# Patient Record
(demographics unavailable — no encounter records)

---

## 2019-08-12 NOTE — EMERGENCY DEPARTMENT RECORD
History of Present Illness





- General


Chief Complaint: Shortness of breath


Stated Complaint: BRONCHITIS WORSE


Time Seen by Provider: 19 12:03


Source: Patient


Mode of Arrival: Ambulatory


Limitations: No limitations





- History of Present Illness


Initial Comments: 


65 yo female presents with a cough that has been ongoing for about three weeks. 

The cough has gradually increased.  She was seen over the weekend at the [facility].  She was given breathing treatments, cough medication, steroids and

antibiotics.  She is not improved.  She now has some green sputum.  No fever.  

No blood in the sputum.  She is a non smoker.  No pulmonary disease history.  





MD Complaint: Cough


Onset/Timin


-: Week(s)


Severity: Moderate


Consistency: Constant


Improves With: Nothing


Worsens With: Coughing


Known History Of: Other


Associated Symptoms: Cough, Sputum production





- Related Data


                                Home Medications











 Medication  Instructions  Recorded  Confirmed  Last Taken


 


Albuterol Sulfate [Proair Hfa] 1 - 2 puff IH .EVERY 4-6 HOURS PRN 19 Unknown


 


Benzonatate 200 mg PO TID 19 Unknown


 


Clobetasol Propionate [Temovate] 30 gm TP ASDIR 19 Unknown


 


Ipratropium Bromide [Atrovent Hfa] 2 spray IH TID 19 Unknown


 


Lisinopril/Hydrochlorothiazide 2 tab PO DAILY 19 Unknown





[Lisinopril-Hctz 20-12.5 mg Tab]    


 


Prednisone [Prednisone 10Mg] 10 mg PO ASDIR 19 Unknown











                                    Allergies











Allergy/AdvReac Type Severity Reaction Status Date / Time


 


amoxicillin Allergy  RASH Verified 19 12:01


 


metronidazole Allergy  NAUSEA AND Verified 19 12:01





   VOMITING  














Travel Screening





- Travel/Exposure Within Last 30 Days


Have you traveled within the last 30 days?: No





- Travel/Exposure Within Last Year


Have you traveled outside the [place] in the last year?: No





- Additonal Travel Details


Have you been exposed to anyone with a communicable illness?: No





- Travel Symptoms


Symptom Screening: None





Review of Systems


Constitutional: Denies: Chills, Fever, Malaise, Weakness


Eyes: Denies: Eye discharge


ENT: Denies: Congestion, Throat pain


Respiratory: Reports: Cough, Dyspnea, Wheezes.  Denies: Hemoptysis, Stridor


Cardiovascular: Denies: Chest pain, Palpitations, Syncope


Endocrine: Denies: Fatigue, Polydipsia, Polyuria


Gastrointestinal: Denies: Abdominal pain, Diarrhea, Nausea, Vomiting


Genitourinary: Denies: Dysuria, Urgency


Musculoskeletal: Denies: Arthralgia, Back pain, Myalgia, Neck pain


Neurological: Denies: Headache, Numbness, Weakness


Psychiatric: Denies: Anxiety


Hematological/Lymphatic: Denies: Easy bleeding, Easy bruising





Past Medical History





- SOCIAL HISTORY


Smoking Status: Never smoker


Alcohol Use: Rare


Drug Use: None





- RESPIRATORY


Hx Respiratory Disorders: No





- CARDIOVASCULAR


Hx Cardio Disorders: Yes


Hx Hypertension: Yes


Hx Irregular Heartbeat: Yes





- NEURO


Hx Neuro Disorders: No





- GI


Hx GI Disorders: No





- 


Hx Genitourinary Disorders: No





- ENDOCRINE


Hx Endocrine Disorders: No





- MUSCULOSKELETAL


Hx Musculoskeletal Disorders: No





- PSYCH


Hx Psych Problems: No





- HEMATOLOGY/ONCOLOGY


Hx Hematology/Oncology Disorders: Yes


Hx Cancer: Yes (uterine)


Hx Chemotherapy: Yes


Hx Radiation Therapy: Yes





Family Medical History


Any Significant Family History?: No





Physical Exam





- General


General Appearance: Alert, Oriented x3, Cooperative, No acute distress


Limitations: No limitations





- Head


Head exam: Atraumatic, Normal inspection





- Eye


Eye exam: Normal appearance, PERRL.  negative: Conjunctival injection, Scleral 

icterus





- ENT


ENT exam: Normal exam, Mucous membranes moist


Ear exam: Normal external inspection


Nasal Exam: Normal inspection


Mouth exam: Normal external inspection





- Neck


Neck exam: Normal inspection





- Respiratory


Respiratory exam: negative: Accessory muscle use, Prolonged expiratory, Rhonchi,

Stridor, Wheezes





- Cardiovascular


Cardiovascular Exam: Regular rate, Normal rhythm, Normal heart sounds





- GI/Abdominal


GI/Abdominal exam: Soft.  negative: Tenderness





- Rectal


Rectal exam: Deferred





- 


 exam: Deferred





- Extremities


Extremities exam: Normal inspection, Full ROM.  negative: Calf tenderness, Pedal

edema, Tenderness





- Back


Back exam: Denies: CVA tenderness (R), CVA tenderness (L)





- Neurological


Neurological exam: Alert, Oriented X3





- Psychiatric


Psychiatric exam: Normal affect, Normal mood





- Skin


Skin exam: Dry, Intact, Normal color, Warm





Course





                                   Vital Signs











  19





  11:51


 


Temperature 97.8 F


 


Pulse Rate 71


 


Respiratory 20





Rate 


 


Blood Pressure 199/102


 


Pulse Ox 97














- Reevaluation(s)


Reevaluation #1: 





19 13:35


The CXR was reviewed


No acute process


The CBC was negative for acute process


The HCO3 was 18 and AG 18


K is 3.3


19 13:54


The patient continues to have some wheezing and coughing 


I recommend OBV for breathing treatments and initiation of antibiotic with the 

new green sputum





Medical Decision Making





- Lab Data


Result diagrams: 


                                 19 12:55





                                 19 12:55





Disposition


Disposition: Admit


Clinical Impression: 


 Bronchitis, Reactive airway disease that is not asthma





Dyspnea


Qualifiers:


 Dyspnea type: unspecified Qualified Code(s): R06.00 - Dyspnea, unspecified





Disposition: Still a Patient at [facility]


Decision to Admit: Admit from ER


Decision to Admit Date: 19


Decision to Admit Time: 13:56


Condition: (2) Stable


Forms:  Patient Portal Access


Time of Disposition: 13:56





Quality





- Quality Measures


Quality Measures: N/A





- Blood Pressure Screening


Does Patient Have Any of the Following: Active Dx of HTN


Blood Pressure Classification: Hypertensive Reading


Systolic Measurement: 199


Diastolic Measurement: 102


Screening for High Blood Pressure: Patient Exclusion, Hx of HTN []

## 2019-08-12 NOTE — RADIOLOGY REPORT
EXAM:  CHEST 2 VIEWS



HISTORY:  NONPRODUCTIVE COUGH FOR TWO WEEKS. 



TECHNIQUE:  Two views of the chest. 



COMPARISON: None. 



FINDINGS:  The heart and mediastinum appear normal. The lungs are clear. 
Skeletal structures show no acute or suspicious finding. 



IMPRESSION:  NO SIGN OF ACUTE CHEST PATHOLOGY.



JOB NUMBER: [id number]

MTDD

## 2019-08-13 NOTE — DISCHARGE SUMMARY
Providers


Discharge Summary Date: 08/13/19


Date of admission: 


08/12/19 14:18





Attending physician: 


RICK DENT





Primary care physician: 


VIANEY TAYLOR D.O.








Physical Exam





- Vital Signs


Vital Signs: 


                            Vital Signs - Last 24 Hrs











  Temp Pulse Pulse Resp BP Pulse Ox


 


 08/13/19 11:12  97.7 F   87  18  141/76  98


 


 08/13/19 10:13   69   18   97


 


 08/13/19 07:38   78   20   99


 


 08/13/19 06:09   65   16   96


 


 08/13/19 04:50  97.7 F   56 L  18  170/62  98


 


 08/12/19 22:09   68   16   95


 


 08/12/19 20:35     20  


 


 08/12/19 19:51  98.2 F   73  20  170/71  96


 


 08/12/19 17:59   75   18   98


 


 08/12/19 15:26    94 H  24  


 


 08/12/19 14:59   81   20   99


 


 08/12/19 14:24  98.1 F   72  19  195/89  100


 


 08/12/19 14:09    77  16  197/90  98


 


 08/12/19 13:34    80  20  199/92  97


 


 08/12/19 12:08   75   22   98














- General


General Appearance: Alert, Oriented x3, Cooperative, No acute distress


Limitations: No limitations





- Head


Head exam: Atraumatic, Normal inspection





- Eye


Eye exam: Normal appearance, PERRL.  negative: Conjunctival injection, Scleral 

icterus





- ENT


ENT exam: Normal exam, Mucous membranes moist


Ear exam: Normal external inspection


Nasal Exam: Normal inspection


Mouth exam: Normal external inspection





- Neck


Neck exam: Normal inspection





- Respiratory


Respiratory exam: Wheezes.  negative: Accessory muscle use, Prolonged 

expiratory, Rhonchi, Stridor





- Cardiovascular


Cardiovascular Exam: Regular rate, Normal rhythm, Normal heart sounds





- GI/Abdominal


GI/Abdominal exam: Soft.  negative: Tenderness





- Rectal


Rectal exam: Deferred





- 


 exam: Deferred





- Extremities


Extremities exam: Normal inspection, Full ROM.  negative: Calf tenderness, Pedal

edema, Tenderness





- Back


Back exam: Denies: CVA tenderness (R), CVA tenderness (L)





- Neurological


Neurological exam: Alert, Oriented X3





- Psychiatric


Psychiatric exam: Normal affect, Normal mood





- Skin


Skin exam: Dry, Intact, Normal color, Warm





Hospitalization





- Hospitalization


Admission Diagnosis: Bronchitis, wheezying, dyspnea





- Problem List/Discharge Diagnosis


(1) Dyspnea


Status: Acute   


Discharge Diagnosis: 


   Dyspnea type: unspecified   Qualified Code(s): R06.00 - Dyspnea, unspecified 

 


Base Code: R06.00 - DYSPNEA, UNSPECIFIED   Comment: 8/13/19:


-Cough x 3 weeks, increasing dyspnea, green sputum production


-CXR normal, no pulmonary hx


-Nebulizer machine ordered and given


-Albuterol nebs TID PRN ordered for home use


-Continue Tessalon Perles q. 8 hours PRN (pt already has supply at home)


-Trial Mucinex 600mg PO BID until increased secretions improve   





(2) Bronchitis


Status: Acute   Base Code: J40 - BRONCHITIS, NOT SPECIFIED AS ACUTE OR CHRONIC  

Comment: 8/13/19:


-Increased dyspnea despite intervention by [facility] on 8/10/19


-Zithromycin 500mg PO x 1 in am on 8/14/19 to total three 500mg doses


-Prednisone 50mg daily x 5, start on 8/14/19


-Albuterol Nebs TID PRN


-Nebulizer machine ordered and given to pt


-Continue Tessalon perles q. 8 hours PRN


-Mucinex 600mg PO BID PRN


   





(3) Essential hypertension with goal blood pressure less than 140/90


Status: Acute   Base Code: I10 - ESSENTIAL (PRIMARY) HYPERTENSION   Comment: 

8/13/19:


-Had not been taking BP meds over the past few days


-/76 this a.m. after receiving Norvasc 2.5mg, Lisinopril 40mg and 25mg 

Hctz yesterday


-Home meds:  Lisinopril 20/12.5mg, Lisinopril 20mg and Hctz 25mg daily


-Stop Lisinopril and combination Lisinopril/Hctz d/t constant tickle in throat


-Start Losartan 50mg daily and Hctz 25mg daily (kidney function normal)


-Pt to f/u in 2 weeks with [facility] to establish care.    





(4) DVT prophylaxis


Status: Acute   Base Code: Z29.9 - ENCOUNTER FOR PROPHYLACTIC MEASURES, 

UNSPECIFIED   Comment: 8/13/19


-Moderate risk


-Nursing to encourage ambulation    





(5) Full code status


Status: Acute   Base Code: Z78.9 - OTHER SPECIFIED HEALTH STATUS   Comment: 

8/13/19:


-Full code this admission   





- Hospitalization Course


Disposition: Home, Self-Care


Hospital Course: 





Kiara Weinberg is a 64 y.o.  F who presented to [facility] ED with c/o cough x

3 weeks and increasing SOB. Was seen on 8/10/19 at [facility] and was 

given breathing tx, tessalon perles, a prednisone taper and antibiotics. She 

presented to ED d/t feeling as thought she couldn't catch her breath. She denied

fever, blood in sputum or chills. Did report green sputum. No hx of smoking, 

COPD or asthma. 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

house cleaning as an occupation. Previous PCP: Dr. Taylor  who has retired. 





ED course





-CXR: no acute process


-CBC WNL


-HCO3: 18, AG 18, K+ 3.3


-Was started on Azithromycin, breathing tx and IV Salumedrol in the ED





8/13/19 1130


VS: T 97.7, HR 87, /76, RR 18, SPO2 98% on RA





Pt sitting up in bedside chair with spouse at bedside. A&Ox3. Reports feeling 

better than yesterday and as though she can breathe today. Reports cough is 

still present. Reports that she does have a constant "tickle" in her throat x 

many years in addition to acute cough x 3 weeks.  States that the tessalon 

perles have not been effective in helping relief the cough. Denies having any 

pain or SOB. Feels ready to discharge. 





BP elevated last evening. Was started on Norvasc 2.5mg daily. BP improved today.

Pt reports that she hasn't taken her BP meds for a few days d/t feeling 

nauseated and not well from the bronchitis. Has been on Lisinopril 40mg daily x 

20 years which she relates to the constant "tickle". Appears as though pt is 

also taking 62.5mg of HCTZ at home through combo meds. Discussed changing BP 

regimen with pt and pt agreeable as pt will be following up as a new patient 

with [facility] in approximately 2 weeks. 








Procedures: 


Imaging and X-Rays





08/12/19 12:07


CHEST 2 VIEWS [RAD] Stat 








Cardiology Procedures





08/12/19 14:31


Cardiac Monitor .Continuous 











Abnormal Labs: 


                              Abnormal Lab Results











  08/12/19 08/12/19 08/13/19 Range/Units





  12:55 12:55 07:08 


 


MPV  11.2 H   11.0 H  (7.4-10.4)  fl


 


Lymphocytes    15.0 L  (16-45)  %


 


Potassium   3.3 L   (3.4-4.5)  mmol/L


 


Carbon Dioxide   18.0 L   (22-29)  mmol/L


 


Anion Gap   18.0 H   (7-16)  


 


Random Glucose   154 H   ()  mg/dL














  08/13/19 Range/Units





  07:08 


 


MPV   (7.4-10.4)  fl


 


Lymphocytes   (16-45)  %


 


Potassium   (3.4-4.5)  mmol/L


 


Carbon Dioxide   (22-29)  mmol/L


 


Anion Gap   (7-16)  


 


Random Glucose  176 H  ()  mg/dL











Condition at Discharge: (1) Good





Discharge Medications





- Discharge Medications


Prescriptions: 


Hydrochlorothiazide [Hctz] 25 mg PO DAILY 30 Days #30 tablet


Losartan Potassium 50 mg PO DAILY 30 Days #30 tablet


Prednisone 50 mg PO DAILY 5 Days #5 tablet


Azithromycin [Zithromax] 500 mg PO DAILY 1 Days #1 tab


Home Medications: 


                                Ambulatory Orders





Albuterol Sulfate [Proair Hfa] 1 - 2 puff IH .EVERY 4-6 HOURS PRN 08/12/19 [Last

 Taken Unknown]


Benzonatate 200 mg PO TID 08/12/19 [Last Taken Unknown]


Clobetasol Propionate [Temovate] 30 gm TP ASDIR 08/12/19 [Last Taken Unknown]


Ipratropium Bromide [Atrovent Hfa] 2 spray IH TID 08/12/19 [Last Taken Unknown]


Acetaminophen [Tylenol 325Mg] 650 mg PO Q6H PRN  tablet 08/13/19 [Last Taken 

Unknown]


Azithromycin [Zithromax] 500 mg PO DAILY 1 Days #1 tab 08/13/19 [Last Taken 

Unknown]


Hydrochlorothiazide [Hctz] 25 mg PO DAILY 30 Days #30 tablet 08/13/19 [Last 

Taken Unknown]


Losartan Potassium 50 mg PO DAILY 30 Days #30 tablet 08/13/19 [Last Taken 

Unknown]


Prednisone 50 mg PO DAILY 5 Days #5 tablet 08/13/19 [Last Taken Unknown]











Discharge Plan





- Discharge Instructions


Activity at Discharge: Resume Usual Activities As Tolerated


Diet at Discharge: Regular Diet


Instructions:  Acute Bronchitis (GEN), How Your Lungs Work (DC)


Additional Instructions: 











Activity: 


 


 Resume Usual Activities As Tolerated














Diet: Regular Diet














 














Follow Up: with new Primary Care Physician














  





  














Additional: 





Take Mucinex 600mg every 12 hours until sputum production decreases





Can take Tessalon Perles as needed





Drink 8-10 cups of water per day





Use Albuterol nebulizers up to 3 times per day as needed





Carry Albuterol rescue inhaler when away from home





Stop taking the blood pressure medications that you have at home





Start Hydrochlorothiazide 25mg every morning (this is a water pill)





Start Losartan 50mg every morning








Quality Measures





- Quality Measures


Quality Measures: Documentation of Current Medications in Medical Record, 

Screening for High Blood Pressure and F/U Documented





- Current Medications


Quality Measure: Measure #130: Documentation of Current Medications


Documentation of Current Medications: <Current Medications Documented/Reviewed> 

[]





- Blood Pressure Screening


Quality Measure: Screening for High Blood Pressure and Follow-Up Documented


Does Patient Have Any of the Following: Active Dx of HTN


Blood Pressure Classification: Hypertensive Reading


Systolic Measurement: 141


Diastolic Measurement: 76


Screening for High Blood Pressure: Patient Exclusion, Hx of HTN []





- Elder Abuse Suspicion Index


EASI Reference Information: Tacho WELCH, Josafat C, Antonia D, Vito FRANCO.Development

 and validation of a tool to assist physicians identification of elder abuse: 

The Elder Abuse Suspicion  Index (EASI ).  Journal of Elder Abuse and Neglect, 

2008; 20 (3): 276-300.

## 2019-08-13 NOTE — HISTORY & PHYSICAL
History of Present Illness





- Date of Service


Date of Service for History & Physical: 08/13/19





- History of Present Illness


Admitting Diagnosis: Bronchitis, wheezying, dyspnea


History of Present Illness: 





Kiara Weinberg is a 64 y.o.  F who presented to [facility] ED with c/o cough x

3 weeks and increasing SOB. Was seen on 8/10/19 at [facility] and was 

given breathing tx, tessalon perles, a prednisone taper and antibiotics. She 

presented to ED d/t feeling as thought she couldn't catch her breath. She denied

fever, blood in sputum or chills. Did report green sputum. No hx of smoking, 

COPD or asthma. 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

house cleaning as an occupation. Previous PCP: Dr. Taylor  who has retired. 





ED course





-CXR: no acute process


-CBC WNL


-HCO3: 18, AG 18, K+ 3.3


-Was started on Azithromycin, breathing tx and IV Salumedrol in the ED





8/13/19 1130


VS: T 97.7, HR 87, /76, RR 18, SPO2 98% on RA





Pt sitting up in bedside chair with spouse at bedside. A&Ox3. Reports feeling 

better than yesterday and as though she can breathe today. Reports cough is 

still present. Reports that she does have a constant "tickle" in her throat x 

many years in addition to acute cough x 3 weeks.  States that the tessalon 

perles have not been effective in helping relief the cough. Denies having any 

pain or SOB. Feels ready to discharge. 





BP elevated last evening. Was started on Norvasc 2.5mg daily. BP improved today.

Pt reports that she hasn't taken her BP meds for a few days d/t feeling 

nauseated and not well from the bronchitis. Has been on Lisinopril 40mg daily x 

20 years which she relates to the constant "tickle". Appears as though pt is 

also taking 62.5mg of HCTZ at home through combo meds. Discussed changing BP 

regimen with pt and pt agreeable as pt will be following up as a new patient [facility] in approximately 2 weeks. 





Travel Screening





- Travel/Exposure Within Last 30 Days


Have you traveled within the last 30 days?: No





- Travel/Exposure Within Last Year


Have you traveled outside the [place] in the last year?: No





- Additonal Travel Details


Have you been exposed to anyone with a communicable illness?: No





- Travel Symptoms


Symptom Screening: None





Review of Systems


Reviewed: No additional complaints except as noted below


Constitutional: Denies: Chills, Fever, Malaise, Weakness


Eyes: Denies: Eye discharge


ENT: Denies: Congestion, Throat pain


Respiratory: Reports: Cough, Dyspnea, Wheezes.  Denies: Hemoptysis, Stridor


Cardiovascular: Denies: Chest pain, Dyspnea on exertion, Edema, Palpitations, 

Syncope


Endocrine: Denies: Fatigue


Gastrointestinal: Denies: Abdominal pain, Nausea, Vomiting


Musculoskeletal: Denies: Arthralgia, Back pain, Myalgia, Neck pain


Neurological: Denies: Headache, Numbness, Weakness


Psychiatric: Denies: Anxiety


Hematological/Lymphatic: Denies: Easy bleeding, Easy bruising





Past Medical History





- SOCIAL HISTORY


Smoking Status: Never smoker





- RESPIRATORY


Hx Respiratory Disorders: No





- CARDIOVASCULAR


Hx Cardio Disorders: Yes


Hx Hypertension: Yes


Hx Irregular Heartbeat: Yes





- NEURO


Hx Neuro Disorders: No





- GI


Hx GI Disorders: No





- 


Hx Genitourinary Disorders: No





- ENDOCRINE


Hx Endocrine Disorders: No





- MUSCULOSKELETAL


Hx Musculoskeletal Disorders: No





- PSYCH


Hx Psych Problems: No





- HEMATOLOGY/ONCOLOGY


Hx Hematology/Oncology Disorders: Yes


Hx Cancer: Yes (uterine)


Hx Chemotherapy: Yes


Hx Radiation Therapy: Yes





Family Medical History


Any Significant Family History?: Yes


Hx Cancer: Father, Mother, Grandparents





H&P Meds/Allergies





- Allergies


Allergies: 


                                    Allergies











Allergy/AdvReac Type Severity Reaction Status Date / Time


 


amoxicillin Allergy  RASH Verified 08/12/19 12:01


 


metronidazole Allergy  NAUSEA AND Verified 08/12/19 12:01





   VOMITING  














- Home Medications


                                Home Medications











 Medication  Instructions  Recorded  Confirmed  Last Taken


 


Albuterol Sulfate [Proair Hfa] 1 - 2 puff IH .EVERY 4-6 HOURS PRN 08/12/19 08/12/19 Unknown


 


Benzonatate 200 mg PO TID 08/12/19 08/12/19 Unknown


 


Clobetasol Propionate [Temovate] 30 gm TP ASDIR 08/12/19 08/12/19 Unknown


 


Ipratropium Bromide [Atrovent Hfa] 2 spray IH TID 08/12/19 08/12/19 Unknown








                                  Previous Rx's











 Medication  Instructions  Recorded


 


Acetaminophen [Tylenol 325Mg] 650 mg PO Q6H PRN  tablet 08/13/19


 


Azithromycin [Zithromax] 500 mg PO DAILY 1 Days #1 tab 08/13/19


 


Hydrochlorothiazide [Hctz] 25 mg PO DAILY 30 Days #30 tablet 08/13/19


 


Losartan Potassium 50 mg PO DAILY 30 Days #30 tablet 08/13/19


 


Prednisone 50 mg PO DAILY 5 Days #5 tablet 08/13/19














- Active Medications


Active Medications: 


                               Current Medications





Acetaminophen (Tylenol 325mg)  650 mg PO Q6H PRN


   PRN Reason: PAIN - MILD(1-4)/FEVER


Albuterol Sulfate (Albuterol Sulfate)  2.5 mg INH RESP.Q2H PRN


   PRN Reason: DIFFICULTY IN BREATHING


   Last Admin: 08/13/19 07:38 Dose:  2.5 mg


   Documented by: 


Albuterol/Ipratropium (Duoneb)  3 ml INH RESP.Q4H.WA [facility]


   Last Admin: 08/13/19 10:13 Dose:  3 ml


   Documented by: 


Amlodipine Besylate (Norvasc)  2.5 mg PO DAILY [facility]


   Last Admin: 08/13/19 09:33 Dose:  2.5 mg


   Documented by: 


Azithromycin (Zithromax)  500 mg PO DAILY [facility]


   Last Admin: 08/13/19 09:32 Dose:  500 mg


   Documented by: 


Benzonatate (Tessalon)  200 mg PO Q8HR [facility]


   Last Admin: 08/13/19 05:37 Dose:  200 mg


   Documented by: 


Diphenhydramine HCl (Benadryl Capsule)  50 mg PO QHS PRN


   PRN Reason: INSOMNIA


Guaifenesin (Robitussin Dm)  10 ml PO Q4H PRN


   PRN Reason: COUGH


   Last Admin: 08/13/19 02:30 Dose:  10 ml


   Documented by: 


Hydrochlorothiazide (Hctz 25mg)  25 mg PO DAILY [facility]


   Last Admin: 08/13/19 09:33 Dose:  25 mg


   Documented by: 


CEFTRIAXONE 1GM/50ML BAG (Ceftriaxone 1 Gm-D5w Bag)  1 gm in 50 mls @ 100 mls/hr

 IVPB Q24H [facility]


   Last Infusion: 08/12/19 16:25 Dose:  Infused


   Documented by: 


Potassium Chloride/Sodium Chloride ( Potassium Chl 20meq/)  20 meq in 1,000 mls 

@ 100 mls/hr IV Q10H [facility]


   Last Admin: 08/13/19 02:32 Dose:  100 mls/hr


   Documented by: 


Lisinopril (Zestril)  40 mg PO DAILY [facility]


   Last Admin: 08/13/19 09:32 Dose:  40 mg


   Documented by: 


Methylprednisolone Sodium Succinate (Solu-Medrol)  60 mg IVP Q8HR [facility]


   Last Admin: 08/13/19 05:36 Dose:  60 mg


   Documented by: 











Physical Exam





- Vital Signs


Vital Signs: 


                            Vital Signs - Last 24 Hrs











  Temp Pulse Pulse Resp BP BP Pulse Ox


 


 08/13/19 11:12  97.7 F   87  18   141/76  98


 


 08/13/19 10:13   69   18    97


 


 08/13/19 07:38   78   20    99


 


 08/13/19 06:09   65   16    96


 


 08/13/19 04:50  97.7 F   56 L  18   170/62  98


 


 08/12/19 22:09   68   16    95


 


 08/12/19 20:35     20   


 


 08/12/19 19:51  98.2 F   73  20   170/71  96


 


 08/12/19 17:59   75   18    98


 


 08/12/19 15:26    94 H  24   


 


 08/12/19 14:59   81   20    99


 


 08/12/19 14:24  98.1 F   72  19   195/89  100


 


 08/12/19 14:09    77  16   197/90  98


 


 08/12/19 13:34    80  20   199/92  97


 


 08/12/19 12:08   75   22    98


 


 08/12/19 11:51  97.8 F  71   20  199/102   97














- General


General Appearance: Alert, Oriented x3, Cooperative, No acute distress


Limitations: No limitations





- Head


Head exam: Atraumatic, Normal inspection





- Eye


Eye exam: Normal appearance, PERRL.  negative: Conjunctival injection, Scleral 

icterus





- ENT


ENT exam: Normal exam, Mucous membranes moist


Ear exam: Normal external inspection


Nasal Exam: Normal inspection


Mouth exam: Normal external inspection





- Neck


Neck exam: Normal inspection





- Respiratory


Respiratory exam: Wheezes.  negative: Accessory muscle use, Prolonged 

expiratory, Rhonchi, Stridor





- Cardiovascular


Cardiovascular Exam: Regular rate, Normal rhythm, Normal heart sounds





- GI/Abdominal


GI/Abdominal exam: Soft.  negative: Tenderness





- Rectal


Rectal exam: Deferred





- 


 exam: Deferred





- Extremities


Extremities exam: Normal inspection, Full ROM.  negative: Calf tenderness, Pedal

 edema, Tenderness





- Back


Back exam: Denies: CVA tenderness (R), CVA tenderness (L)





- Neurological


Neurological exam: Alert, Oriented X3





- Psychiatric


Psychiatric exam: Normal affect, Normal mood





- Skin


Skin exam: Dry, Intact, Normal color, Warm





Results





- Labs


Result Diagrams: 


                                 08/13/19 07:08





                                 08/13/19 07:08


Labs Last 24 Hours: 


                         Laboratory Results - last 24 hr











  08/12/19 08/12/19 08/13/19





  12:55 12:55 07:08


 


WBC  10.8   6.7


 


RBC  4.56   4.24


 


Hgb  14.3   13.0


 


Hct  42.1   38.8


 


MCV  92.3   91.5


 


MCH  31.4   30.7


 


MCHC  34.0   33.5


 


RDW  12.4   12.5


 


Plt Count  138   151


 


MPV  11.2 H   11.0 H


 


Gran %  75.4  


 


Neutrophils %    80.0


 


Band Neutrophils %    2.0


 


Lymphocytes %  17.4  


 


Monocytes %  5.9  


 


Eosinophils %  0.1   Not Reportable


 


Basophils %  1.2   Not Reportable


 


Absolute Neutrophils  8.11   5.24


 


Lymphocytes    15.0 L


 


Monocytes    3.0


 


Platelet Estimate    Normal


 


RBC Morphology    Normal


 


Sodium   141 


 


Potassium   3.3 L 


 


Chloride   105 


 


Carbon Dioxide   18.0 L 


 


Anion Gap   18.0 H 


 


BUN   16 


 


Creatinine   0.6 


 


Estimated GFR   > 60 


 


Random Glucose   154 H 


 


Calcium   9.0 














  08/13/19





  07:08


 


WBC 


 


RBC 


 


Hgb 


 


Hct 


 


MCV 


 


MCH 


 


MCHC 


 


RDW 


 


Plt Count 


 


MPV 


 


Gran % 


 


Neutrophils % 


 


Band Neutrophils % 


 


Lymphocytes % 


 


Monocytes % 


 


Eosinophils % 


 


Basophils % 


 


Absolute Neutrophils 


 


Lymphocytes 


 


Monocytes 


 


Platelet Estimate 


 


RBC Morphology 


 


Sodium  145


 


Potassium  3.7


 


Chloride  107


 


Carbon Dioxide  23.0


 


Anion Gap  15.0


 


BUN  8


 


Creatinine  0.5


 


Estimated GFR  > 60


 


Random Glucose  176 H


 


Calcium  9.1














VTE H&P Assessment





- Risk for VTE


Risk for VTE: Yes


Risk Level: Moderate


Risk Assessment Date: 08/13/19


Risk Assessment Time: 11:30


VTE Orders Placed or Will Be Placed: Yes





Plan





- Detailed Diagnosis and Plan


(1) Dyspnea


Status: Acute   


Qualifiers: 


   Dyspnea type: unspecified   Qualified Code(s): R06.00 - Dyspnea, unspecified 

  


Base Code: R06.00 - DYSPNEA, UNSPECIFIED   Comment: 8/13/19:


-Cough x 3 weeks, increasing dyspnea, green sputum production


-CXR normal, no pulmonary hx


-Albuterol nebs q. 2 hours while awake as needed


-Tessalon Perles q. 8 hours PRN


-Guaifenesin/Dextromethorphan PO q. 4 hours PRN


   





(2) Bronchitis


Status: Acute   Base Code: J40 - BRONCHITIS, NOT SPECIFIED AS ACUTE OR CHRONIC  

 Comment: 8/13/19:


-Increased dyspnea despite intervention by [facility] on 8/10/19


-Rocephin IV and Zithromycin 500mg PO daily


-Solumedrol 60mg IV q. 8 hours


-Albuterol Nebs q. 2 hours PRN


-Tessalon perles q. 8 hours PRN


-Guaifenesin/Dextromethorphan q. 4 hours PRN


   





(3) Essential hypertension with goal blood pressure less than 140/90


Status: Acute   Base Code: I10 - ESSENTIAL (PRIMARY) HYPERTENSION   Comment: 

8/13/19:


-Lisinopril 40mg daily and Hctz 25mg daily ordered


-SBP in 190s during day on 8/12/19, Norvasc 2.5mg PO added last night 


-/76 this a.m.


-Home meds:  Lisinopril 20/12.5mg, Lisinopril 20mg and Hctz 25mg daily   





(4) Full code status


Status: Acute   Base Code: Z78.9 - OTHER SPECIFIED HEALTH STATUS   Comment: 

8/13/19:


-Full code this admission   





(5) DVT prophylaxis


Status: Acute   Base Code: Z29.9 - ENCOUNTER FOR PROPHYLACTIC MEASURES, 

UNSPECIFIED   Comment: 8/13/19


-Moderate risk


-Nursing to encourage ambulation